# Patient Record
Sex: FEMALE | Race: WHITE | NOT HISPANIC OR LATINO | Employment: FULL TIME | URBAN - NONMETROPOLITAN AREA
[De-identification: names, ages, dates, MRNs, and addresses within clinical notes are randomized per-mention and may not be internally consistent; named-entity substitution may affect disease eponyms.]

---

## 2016-01-15 LAB — HM PAP SMEAR: NORMAL

## 2017-01-26 ENCOUNTER — OFFICE VISIT (OUTPATIENT)
Dept: OBSTETRICS AND GYNECOLOGY | Facility: CLINIC | Age: 32
End: 2017-01-26

## 2017-01-26 VITALS
BODY MASS INDEX: 23.82 KG/M2 | SYSTOLIC BLOOD PRESSURE: 120 MMHG | HEIGHT: 65 IN | WEIGHT: 143 LBS | DIASTOLIC BLOOD PRESSURE: 62 MMHG

## 2017-01-26 VITALS
WEIGHT: 144 LBS | HEIGHT: 65 IN | BODY MASS INDEX: 23.99 KG/M2 | DIASTOLIC BLOOD PRESSURE: 64 MMHG | SYSTOLIC BLOOD PRESSURE: 116 MMHG

## 2017-01-26 DIAGNOSIS — Z01.419 WOMEN'S ANNUAL ROUTINE GYNECOLOGICAL EXAMINATION: Primary | ICD-10-CM

## 2017-01-26 PROCEDURE — 99385 PREV VISIT NEW AGE 18-39: CPT | Performed by: NURSE PRACTITIONER

## 2017-01-26 RX ORDER — LORATADINE 10 MG/1
TABLET ORAL
COMMUNITY
Start: 2015-03-17 | End: 2018-02-07

## 2017-01-26 NOTE — MR AVS SNAPSHOT
"                        Johana Zavala   2017 2:40 PM   Office Visit    Dept Phone:  [phone]   Encounter #:  [id number]    Provider:  Erin Navarro CNM   Department:  [facility]                Your Full Care Plan              Your Updated Medication List          This list is accurate as of: 17  3:45 PM.  Always use your most recent med list.                loratadine 10 MG tablet   Commonly known as:  CLARITIN               Instructions     None    Patient Instructions History      Upcoming Appointments     Visit Type Date Time Department    ANNUAL 2017  2:40 PM [facility]      [email or web address] Signup     [facility] [email or web address] allows you to send messages to your doctor, view your test results, renew your prescriptions, schedule appointments, and more. To sign up, go to [employer] and click on the Sign Up Now link in the New User? box. Enter your [email or web address] Activation Code exactly as it appears below along with the last four digits of your Social Security Number and your Date of Birth () to complete the sign-up process. If you do not sign up before the expiration date, you must request a new code.    [email or web address] Activation Code: [email or web address]  Expires: 2017  3:45 PM    If you have questions, you can email [email or web address] or call [phone] to talk to our [email or web address] staff. Remember, [email or web address] is NOT to be used for urgent needs. For medical emergencies, dial 911.               Other Info from Your Visit           Allergies     No Known Allergies      Reason for Visit     Gynecologic Exam no complaints       Vital Signs     Blood Pressure Height Weight Breastfeeding? Body Mass Index Smoking Status    120/62 65\" (165.1 cm) 143 lb (64.9 kg) No 23.8 kg/m2 Never Smoker        "

## 2017-01-26 NOTE — PROGRESS NOTES
"Subjective   Chief Complaint   Patient presents with   • Gynecologic Exam     no complaints      Johana Zavala is a 31 y.o. year old No obstetric history on file. presenting to be seen for her annual exam. She has no C/O.  Periods monthly duration 5-6 days - 2 days with heavy flow - takes ibuprofen for cramps -   not sexually active past year    Past Medical History   Diagnosis Date   • Abnormal Pap smear of cervix    • Chlamydia      2002        Current Outpatient Prescriptions:   •  loratadine (CLARITIN) 10 MG tablet, Take  by mouth., Disp: , Rfl:    No Known Allergies   Past Surgical History   Procedure Laterality Date   • Leep     • Cervical biopsy  w/ loop electrode excision     • [place] tooth extraction        Social History     Social History   • Marital status: Single     Spouse name: N/A   • Number of children: N/A   • Years of education: N/A     Occupational History   • Not on file.     Social History Main Topics   • Smoking status: Never Smoker   • Smokeless tobacco: Never Used   • Alcohol use No   • Drug use: No   • Sexual activity: Yes     Birth control/ protection: None     Other Topics Concern   • Not on file     Social History Narrative      Family History   Problem Relation Age of Onset   • Adopted: Yes       The following portions of the patient's history were reviewed and updated as appropriate:problem list, current medications, allergies, past family history, past medical history, past social history and past surgical history.    Review of Systems   Constitutional: Negative.    HENT: Negative.    Eyes: Negative.    Respiratory: Negative.    Cardiovascular: Negative.    Gastrointestinal: Negative.    Endocrine: Negative.    Genitourinary: Negative.    Musculoskeletal: Negative.    Skin: Negative.    Allergic/Immunologic: Negative.    Neurological: Negative.    Hematological: Negative.    Psychiatric/Behavioral: Negative.            Objective   Visit Vitals   • /62   • Ht 65\" (165.1 cm)   • Wt " 143 lb (64.9 kg)   • Breastfeeding No   • BMI 23.8 kg/m2       Physical Exam   Constitutional: She is oriented to person, place, and time. She appears well-developed and well-nourished.   HENT:   Head: Normocephalic and atraumatic.   Neck: Normal range of motion. No thyromegaly present.   Pulmonary/Chest: Effort normal and breath sounds normal. Right breast exhibits no mass, no nipple discharge, no skin change and no tenderness. Left breast exhibits no inverted nipple, no mass, no nipple discharge, no skin change and no tenderness. Breasts are symmetrical. There is no breast swelling.   Abdominal: Soft. She exhibits no distension. There is no tenderness.   Genitourinary: Vagina normal and uterus normal. No breast tenderness, discharge or bleeding.   Musculoskeletal: Normal range of motion.   Neurological: She is alert and oriented to person, place, and time.   Skin: Skin is warm and dry.   Psychiatric: She has a normal mood and affect. Her behavior is normal. Judgment and thought content normal.            Assessment /Plan    Normal GYN exam   Pap today  SBE monthly   Options re: lightening periods - declined   Options for labs - declined   Call prn     There are no diagnoses linked to this encounter.             This note was electronically signed.    Erin Navarro CNM   January 26, 2017

## 2018-02-07 ENCOUNTER — OFFICE VISIT (OUTPATIENT)
Dept: OBSTETRICS AND GYNECOLOGY | Facility: CLINIC | Age: 33
End: 2018-02-07

## 2018-02-07 VITALS
SYSTOLIC BLOOD PRESSURE: 124 MMHG | DIASTOLIC BLOOD PRESSURE: 62 MMHG | WEIGHT: 153 LBS | BODY MASS INDEX: 25.49 KG/M2 | HEIGHT: 65 IN

## 2018-02-07 DIAGNOSIS — Z01.419 ENCOUNTER FOR GYNECOLOGICAL EXAMINATION WITHOUT ABNORMAL FINDING: Primary | ICD-10-CM

## 2018-02-07 PROCEDURE — 99395 PREV VISIT EST AGE 18-39: CPT | Performed by: NURSE PRACTITIONER

## 2018-02-07 RX ORDER — LEVOCETIRIZINE DIHYDROCHLORIDE 5 MG/1
5 TABLET, FILM COATED ORAL EVERY EVENING
COMMUNITY
End: 2023-01-19

## 2018-02-07 RX ORDER — TRIAMCINOLONE ACETONIDE 1 MG/G
CREAM TOPICAL AS NEEDED
COMMUNITY
End: 2023-01-19

## 2018-02-07 RX ORDER — FLUTICASONE PROPIONATE 50 MCG
SPRAY, SUSPENSION (ML) NASAL
COMMUNITY
Start: 2018-01-09

## 2018-02-07 NOTE — PROGRESS NOTES
Chief Complaint   Patient presents with   • Gynecologic Exam     last pap 17, no complaints      Johana Zavala is a 33 y.o. year old  presenting to be seen for her gyn annual exam. Periods monthly - approx 6-7 days - day 2-3 heavy - not problematic and no desire for intervention.   No c/o        Past Medical History:   Diagnosis Date   • Abnormal Pap smear of cervix    • Asthma    • Chlamydia             Current Outpatient Prescriptions:   •  levocetirizine (XYZAL) 5 MG tablet, Take 5 mg by mouth Every Evening., Disp: , Rfl:   •  triamcinolone (KENALOG) 0.1 % cream, Apply  topically As Needed., Disp: , Rfl:   •  Unable to find, 1 each 1 (One) Time. Allergy sublingual drops, Disp: , Rfl:   •  fluticasone (FLONASE) 50 MCG/ACT nasal spray, , Disp: , Rfl:    No Known Allergies   Past Surgical History:   Procedure Laterality Date   • CERVICAL BIOPSY  W/ LOOP ELECTRODE EXCISION     • LEEP     • WISDOM TOOTH EXTRACTION        Social History     Social History   • Marital status: Single     Spouse name: N/A   • Number of children: N/A   • Years of education: N/A     Occupational History   • Not on file.     Social History Main Topics   • Smoking status: Never Smoker   • Smokeless tobacco: Never Used   • Alcohol use No   • Drug use: No   • Sexual activity: Yes     Birth control/ protection: None     Other Topics Concern   • Not on file     Social History Narrative      Family History   Problem Relation Age of Onset   • Adopted: Yes   • Melanoma Mother        The following portions of the patient's history were reviewed and updated as appropriate:problem list, current medications, allergies, past family history, past medical history, past social history and past surgical history.    Review of Systems     Constitutional: Negative.    HENT: Negative.    Respiratory: Negative.    Cardiovascular: Negative.    Gastrointestinal: Negative for abdominal pain, constipation and diarrhea.   Endocrine: Negative.   "  Genitourinary: Negative.    Musculoskeletal: Negative.    Neurological: Negative.    Psychiatric/Behavioral: Negative.         Objective    /62  Ht 165.1 cm (65\")  Wt 69.4 kg (153 lb)  LMP 01/15/2018  Breastfeeding? No  BMI 25.46 kg/m2    Physical Exam    Constitutional   The patient is awake, alert, well developed, well nourished and well groomed.   Neck   The neck is supple and the trachea is midline. The thyroid is not enlarged and there are no palpable nodules.   Breast  Inspection of the breast reveals symmetrical and smooth breast bilaterally without skin color changes, lesions, dimpling or skin retraction.  The nipples are  everted and without discharge.  Palpation of the breast the tissue is non-tender, smooth, without masses.  The axillae are without masses.   Respiratory  The patient is relaxed and breathes without effort. Lungs CTAB  Cardiovascular  Heart regular rate and rhythm without murmur.  Gastrointestinal   The abdomen is soft and non-tender.  No hepatosplenomegaly.  Genitourinary   - External Genitalia without erythema, lesions, or masses  -Urethral Meatus is without erythema, edema, prolapse or lesions.   -Bladder - Palpation at the region above the symphysis pubis             reveals no bladder tenderness.   -Vagina - There is no excessive vaginal discharge.&    vaginal walls reveals moist vaginal mucosa without inflammation or lesions    There is no evidence of pelvic relaxation; there is no cystocele or rectocele.  -Cervix  is smooth, pink, and without discharge. Negative cervical motion tenderness   Uterus - uterine body is of normal size, shape, & without tenderness  Adnexa structures are nontender and without masses  Perineum is without inflammation or lesions   Extremities  Full ROM. No cyanosis or edema   Skin  Normal in color, texture, moisture, temperature, mobility and turgor. There are no abnormal lesions.    Psychiatric  The patient is oriented to person, place, and " time. Speech is fluent and words are clear          Assessment   Normal GYN exam      Plan  Pap was done today.  If she does not receive the results of the Pap within 2 weeks  time, she was instructed to call to find out the results.  I explained to Johana that the recommendations for Pap smear interval in a low risk patient  has lengthened to 3 years time.  I encouraged her to be seen yearly for a full physical exam including breast and pelvic exam even during the off years when PAP's will not be performed.  SBE monthly   Option for general labs lipid profile / CBC / CMP -  declined   Call prn        This note was electronically signed.    Erin Navarro CNM   February 7, 2018

## 2018-02-16 DIAGNOSIS — Z01.419 ENCOUNTER FOR GYNECOLOGICAL EXAMINATION WITHOUT ABNORMAL FINDING: ICD-10-CM

## 2019-02-15 ENCOUNTER — OFFICE VISIT (OUTPATIENT)
Dept: OBSTETRICS AND GYNECOLOGY | Facility: CLINIC | Age: 34
End: 2019-02-15

## 2019-02-15 VITALS
SYSTOLIC BLOOD PRESSURE: 122 MMHG | BODY MASS INDEX: 24.32 KG/M2 | DIASTOLIC BLOOD PRESSURE: 62 MMHG | WEIGHT: 146 LBS | HEIGHT: 65 IN

## 2019-02-15 DIAGNOSIS — Z01.419 ENCOUNTER FOR GYNECOLOGICAL EXAMINATION WITHOUT ABNORMAL FINDING: Primary | ICD-10-CM

## 2019-02-15 PROCEDURE — 99395 PREV VISIT EST AGE 18-39: CPT | Performed by: NURSE PRACTITIONER

## 2019-02-15 RX ORDER — BETAMETHASONE DIPROPIONATE 0.05 %
OINTMENT (GRAM) TOPICAL
COMMUNITY
End: 2023-01-19

## 2019-02-15 RX ORDER — MONTELUKAST SODIUM 10 MG/1
TABLET ORAL
COMMUNITY
End: 2023-01-19

## 2019-02-15 RX ORDER — EPINEPHRINE 0.3 MG/.3ML
INJECTION SUBCUTANEOUS
COMMUNITY
End: 2023-01-19

## 2019-02-15 NOTE — PROGRESS NOTES
Chief Complaint   Patient presents with   • Gynecologic Exam     last pap 18, no complaints      Johana Zavala is a 34 y.o. year old  presenting to be seen for her annual exam.  She has monthly periods - duration is 6 days 2 days heavy - she uses ibuprofen for cramps if needed.  She has no c/o - would like to have labs drawn for work / glucose - lipid profile.   She reports she lives a healthy life style / exercise and good nutrition ...    There is no family hx of breast or ovarian cancer        Past Medical History:   Diagnosis Date   • Abnormal Pap smear of cervix    • Asthma    • Chlamydia             Current Outpatient Medications:   •  betamethasone dipropionate (DIPROLENE) 0.05 % ointment, betamethasone dipropionate 0.05 % topical ointment, Disp: , Rfl:   •  Betamethasone Dipropionate (SERNIVO) 0.05 % emulsion, Sernivo 0.05 % topical spray with pump, Disp: , Rfl:   •  Crisaborole (EUCRISA) 2 % ointment, Eucrisa 2 % topical ointment, Disp: , Rfl:   •  EPINEPHrine (EPIPEN) 0.3 MG/0.3ML solution auto-injector injection, epinephrine 0.3 mg/0.3 mL injection, auto-injector, Disp: , Rfl:   •  fluticasone (FLONASE) 50 MCG/ACT nasal spray, , Disp: , Rfl:   •  levocetirizine (XYZAL) 5 MG tablet, Take 5 mg by mouth Every Evening., Disp: , Rfl:   •  montelukast (SINGULAIR) 10 MG tablet, montelukast 10 mg tablet, Disp: , Rfl:   •  triamcinolone (KENALOG) 0.1 % cream, Apply  topically As Needed., Disp: , Rfl:   •  Unable to find, 1 each 1 (One) Time. Allergy sublingual drops, Disp: , Rfl:    No Known Allergies   Past Surgical History:   Procedure Laterality Date   • CERVICAL BIOPSY  W/ LOOP ELECTRODE EXCISION     • LEEP     • WISDOM TOOTH EXTRACTION        Social History     Socioeconomic History   • Marital status: Single     Spouse name: Not on file   • Number of children: Not on file   • Years of education: Not on file   • Highest education level: Not on file   Social Needs   • Financial resource strain:  "Not on file   • Food insecurity - worry: Not on file   • Food insecurity - inability: Not on file   • Transportation needs - medical: Not on file   • Transportation needs - non-medical: Not on file   Occupational History   • Not on file   Tobacco Use   • Smoking status: Never Smoker   • Smokeless tobacco: Never Used   Substance and Sexual Activity   • Alcohol use: No   • Drug use: No   • Sexual activity: Yes     Birth control/protection: None   Other Topics Concern   • Not on file   Social History Narrative   • Not on file      Family History   Adopted: Yes   Problem Relation Age of Onset   • Melanoma Mother        The following portions of the patient's history were reviewed and updated as appropriate:problem list, current medications, allergies, past family history, past medical history, past social history and past surgical history.      Review of Systems  Constitutional: Negative.    HENT: Negative.    Respiratory: Negative.    Cardiovascular: Negative.    Gastrointestinal: Negative for abdominal pain, constipation and diarrhea.   Endocrine: Negative.    Genitourinary: Negative.    Musculoskeletal: Negative.    Neurological: Negative.    Psychiatric/Behavioral: Negative.         Objective    /62   Ht 165.1 cm (65\")   Wt 66.2 kg (146 lb)   Breastfeeding? No   BMI 24.30 kg/m²     Physical Exam    Constitutional   The patient is awake, alert, well developed, well nourished and well groomed.   Neck   The neck is supple and the trachea is midline.    Breast  Examined in supine position.  Symmetrical without masses or skin dimpling.   The nipples are normal without inversion or discharge.   There are no palpable axillary nodes.    Respiratory  The patient is relaxed and breathes without effort.  Gastrointestinal   The abdomen is soft and non-tender.  No hepatosplenomegaly noted.  Genitourinary   - External Genitalia without erythema, lesions, or masses  -Urethral Meatus normal.   -Vagina - normal pink mucosa " without lesions.      There is no evidence of pelvic relaxation; there is no cystocele or rectocele.  -Cervix:  Normal appearance - Negative cervical motion tenderness   Uterus - uterine body is of normal size, shape, & without tenderness  Adnexa - normal bimanual exam - no masses are palpable  Perineum is without inflammation or lesions  Extremities  Full ROM.   Skin  Warm and dry    Psychiatric  The patient is oriented to person, place, and time. Speech is fluent and words are clear          Assessment   Normal GYN exam      Plan  Pap was done today.  If she does not receive the results of the Pap within 2 weeks  time, she was instructed to call to find out the results.  I explained to Johana that the recommendations for Pap smear interval in a low risk patient  has lengthened to 3 years time.  I encouraged her to be seen yearly for a full physical exam including breast and pelvic exam even during the off years when PAP's will not be performed.  SBE monthly   Will order labs as requested - instructed NPO   Call prn       This note was electronically signed.    Erin Navarro CNM   February 15, 2019

## 2019-02-18 LAB
ALBUMIN SERPL-MCNC: 4.5 G/DL (ref 3.5–5)
ALBUMIN/GLOB SERPL: 1.8 G/DL (ref 1–2)
ALP SERPL-CCNC: 44 U/L (ref 38–126)
ALT SERPL-CCNC: 26 U/L (ref 13–69)
AST SERPL-CCNC: 19 U/L (ref 15–46)
BASOPHILS # BLD AUTO: 0.04 10*3/MM3 (ref 0–0.2)
BASOPHILS NFR BLD AUTO: 0.6 % (ref 0–2.5)
BILIRUB SERPL-MCNC: 0.5 MG/DL (ref 0.2–1.3)
BUN SERPL-MCNC: 8 MG/DL (ref 7–20)
BUN/CREAT SERPL: 11.4 (ref 7.1–23.5)
CALCIUM SERPL-MCNC: 9.7 MG/DL (ref 8.4–10.2)
CHLORIDE SERPL-SCNC: 105 MMOL/L (ref 98–107)
CHOLEST SERPL-MCNC: 161 MG/DL (ref 0–199)
CO2 SERPL-SCNC: 25 MMOL/L (ref 26–30)
CREAT SERPL-MCNC: 0.7 MG/DL (ref 0.6–1.3)
EOSINOPHIL # BLD AUTO: 0.07 10*3/MM3 (ref 0–0.7)
EOSINOPHIL NFR BLD AUTO: 1.1 % (ref 0–7)
ERYTHROCYTE [DISTWIDTH] IN BLOOD BY AUTOMATED COUNT: 13.1 % (ref 11.5–14.5)
GLOBULIN SER CALC-MCNC: 2.5 GM/DL
GLUCOSE SERPL-MCNC: 90 MG/DL (ref 74–98)
HCT VFR BLD AUTO: 41.4 % (ref 37–47)
HDLC SERPL-MCNC: 65 MG/DL (ref 40–60)
HGB BLD-MCNC: 14.1 G/DL (ref 12–16)
IMM GRANULOCYTES # BLD AUTO: 0.01 10*3/MM3 (ref 0–0.06)
IMM GRANULOCYTES NFR BLD AUTO: 0.2 % (ref 0–0.6)
LDLC SERPL CALC-MCNC: 86 MG/DL (ref 0–99)
LYMPHOCYTES # BLD AUTO: 1.33 10*3/MM3 (ref 0.6–3.4)
LYMPHOCYTES NFR BLD AUTO: 21 % (ref 10–50)
MCH RBC QN AUTO: 32.6 PG (ref 27–31)
MCHC RBC AUTO-ENTMCNC: 34.1 G/DL (ref 30–37)
MCV RBC AUTO: 95.6 FL (ref 81–99)
MONOCYTES # BLD AUTO: 0.51 10*3/MM3 (ref 0–0.9)
MONOCYTES NFR BLD AUTO: 8.1 % (ref 0–12)
NEUTROPHILS # BLD AUTO: 4.36 10*3/MM3 (ref 2–6.9)
NEUTROPHILS NFR BLD AUTO: 69 % (ref 37–80)
NRBC BLD AUTO-RTO: 0 /100 WBC (ref 0–0)
PLATELET # BLD AUTO: 204 10*3/MM3 (ref 130–400)
POTASSIUM SERPL-SCNC: 4 MMOL/L (ref 3.5–5.1)
PROT SERPL-MCNC: 7 G/DL (ref 6.3–8.2)
RBC # BLD AUTO: 4.33 10*6/MM3 (ref 4.2–5.4)
SODIUM SERPL-SCNC: 139 MMOL/L (ref 137–145)
TRIGL SERPL-MCNC: 50 MG/DL
VLDLC SERPL CALC-MCNC: 10 MG/DL
WBC # BLD AUTO: 6.32 10*3/MM3 (ref 4.8–10.8)

## 2019-02-20 ENCOUNTER — RESULTS ENCOUNTER (OUTPATIENT)
Dept: OBSTETRICS AND GYNECOLOGY | Facility: CLINIC | Age: 34
End: 2019-02-20

## 2019-02-20 DIAGNOSIS — Z01.419 ENCOUNTER FOR GYNECOLOGICAL EXAMINATION WITHOUT ABNORMAL FINDING: ICD-10-CM

## 2019-02-25 DIAGNOSIS — Z01.419 ENCOUNTER FOR GYNECOLOGICAL EXAMINATION WITHOUT ABNORMAL FINDING: ICD-10-CM

## 2020-10-07 ENCOUNTER — TREATMENT (OUTPATIENT)
Dept: PHYSICAL THERAPY | Facility: CLINIC | Age: 35
End: 2020-10-07

## 2020-10-07 DIAGNOSIS — S76.011S STRAIN OF RIGHT HIP, SEQUELA: Primary | ICD-10-CM

## 2020-10-07 DIAGNOSIS — S86.911S STRAIN OF RIGHT KNEE, SEQUELA: ICD-10-CM

## 2020-10-07 PROCEDURE — 97110 THERAPEUTIC EXERCISES: CPT | Performed by: PHYSICAL THERAPIST

## 2020-10-07 PROCEDURE — 97161 PT EVAL LOW COMPLEX 20 MIN: CPT | Performed by: PHYSICAL THERAPIST

## 2020-10-07 PROCEDURE — 97530 THERAPEUTIC ACTIVITIES: CPT | Performed by: PHYSICAL THERAPIST

## 2020-10-07 NOTE — PROGRESS NOTES
Physical Therapy Initial Evaluation and Plan of Care      Patient: Johana Zavala   : 1985  Diagnosis/ICD-10 Code:  No primary diagnosis found.  Referring practitioner: Rosalio Vora PA-C    Subjective Evaluation    History of Present Illness  Mechanism of injury: R knee pain for 2 months when she started running again.      She started running 6 months ago.  Switching from TM to hard surface and the pain elevated.     R lateral and inferior knee pain on the R LE.      Pt is training for the 5k in 4 weeks.     Pt with the brace for 2 weeks in use when she is running.     Pain  Current pain ratin  At worst pain ratin  Location: R knee lateral knee.    Aggravating factors: prolonged positioning, stairs, squatting, repetitive movement and movement  Progression: worsening             Objective          Observations     Additional Knee Observation Details  R over Left legs crossed.     Ambulation- noted R hip trendelenburg slightly noted on the R LE.  Pt is not having R UE moving with ambulation.      Palpation     Right   Hypertonic in the distal biceps femoris, gluteus ady, gluteus medius and proximal semitendinosus.   Hypotonic in the piriformis. Tenderness of the distal biceps femoris, gluteus ady, gluteus medius, proximal semimembranosus and proximal semitendinosus.     Tenderness     Right Hip   Tenderness in the greater trochanter.     Active Range of Motion   Left Knee   Normal active range of motion    Right Knee   Normal active range of motion    Passive Range of Motion   Left Knee   Normal passive range of motion    Right Knee   Normal passive range of motion    Patellar Mobility     Right Knee Patellar tendons within functional limits include the medial, lateral, superior and inferior.     Additional Patellar Mobility Details  No subluxation noted and no hypersensitivity noted.     Strength/Myotome Testing     Left Hip   Planes of Motion   Abduction: 4+  External rotation: 4+    Right  Hip   Planes of Motion   Abduction: 3+  External rotation: 3+    Right Knee   Flexion: 4-  Prone flexion: 3+  Quadriceps contraction: fair    Additional Strength Details  Pain with R hip testing.     Tests     Additional Tests Details  R hip seemed to be most provocative for pain in the R Knee.               Assessment & Plan     Assessment  Impairments: abnormal gait, abnormal muscle tone, abnormal or restricted ROM, activity intolerance, impaired balance, impaired physical strength, lacks appropriate home exercise program and pain with function  Assessment details: Patient is a 35 year old female who comes to physical therapy R knee pain. Signs and symptoms are consistent with PF pain from R hip MM strain and poor mechanical alignment with activity.  The patient currently has pain, decreased ROM, decreased strength, and inability to perform all essential functional activities. Pt will benefit from skilled PT services to address the above issues.     Prognosis: good  Prognosis details:   SHORT TERM GOALS:  2 weeks       1. Pt independent with HEP  2. Pt to demonstrate qing hip strength 4/5 or greater to improve stability with ambulation  3. Pt to report being able to walk for 10 minutes without increasing pain in the right knee    LONG TERM GOALS:   6 weeks  1. Pt to demonstrate ability to perform full functional squat with good form and control of the knees and without increasing pain  2. Pt to demonstrate qing hip strength to 4+/5 or greater to improve safety with ambulation on uneven surfaces  3. Pt to return to work full duty without increased pain in the right knee   4. Pt to demonstrate ability to perform step up/down 8 inch step x10 safely and without pain in the right knee   Functional Limitations: carrying objects, walking, uncomfortable because of pain, standing and unable to perform repetitive tasks  Plan  Therapy options: will be seen for skilled physical therapy services  Planned modality interventions:  cryotherapy, electrical stimulation/Russian stimulation, thermotherapy (hydrocollator packs) and ultrasound  Planned therapy interventions: stretching, strengthening, soft tissue mobilization, manual therapy, motor coordination training, neuromuscular re-education, ADL retraining, balance/weight-bearing training, flexibility, functional ROM exercises, gait training and home exercise program  Treatment plan discussed with: patient  Plan details: Pt will be seen 1-2 times per week for skilled PT.         Manual Therapy:    2     mins  37375;  Therapeutic Exercise:    18     mins  97733;     Neuromuscular Chip:        mins  76933;    Therapeutic Activity:     9     mins  68856;     Gait Training:           mins  92709;     Ultrasound:          mins  48390;    Electrical Stimulation:         mins  66921 ( );  Dry Needling          mins self-pay    Timed Treatment:   29   mins   Total Treatment:     50   mins    PT SIGNATURE: Fortino Noe, PT   DATE TREATMENT INITIATED: 10/7/2020    Initial Certification  Certification Period: 1/5/2021  I certify that the therapy services are furnished while this patient is under my care.  The services outlined above are required by this patient, and will be reviewed every 90 days.     PHYSICIAN: Rosalio Vora PA-C      DATE:     Please sign and return via fax to  .. Thank you, [facility].

## 2020-10-09 ENCOUNTER — TREATMENT (OUTPATIENT)
Dept: PHYSICAL THERAPY | Facility: CLINIC | Age: 35
End: 2020-10-09

## 2020-10-09 DIAGNOSIS — S76.011S STRAIN OF RIGHT HIP, SEQUELA: Primary | ICD-10-CM

## 2020-10-09 DIAGNOSIS — S86.911S STRAIN OF RIGHT KNEE, SEQUELA: ICD-10-CM

## 2020-10-09 PROCEDURE — 97140 MANUAL THERAPY 1/> REGIONS: CPT | Performed by: PHYSICAL THERAPIST

## 2020-10-09 PROCEDURE — 97035 APP MDLTY 1+ULTRASOUND EA 15: CPT | Performed by: PHYSICAL THERAPIST

## 2020-10-09 PROCEDURE — 97110 THERAPEUTIC EXERCISES: CPT | Performed by: PHYSICAL THERAPIST

## 2020-10-09 NOTE — PROGRESS NOTES
Physical Therapy Daily Progress Note    Patient Information  Johana Zavala  1985      Visit # : 2    Johana Zavala reports 5/10 pain today at rest.  Pt reports she is doing well with the exercises.     Pt reports that yesterday she was pain    Objective Pt presents to PT today with no distress noted at rest.     Palpation:  R glute medius primary area of pain.     Weakness still present in the R hip.      See Exercise, Manual, and Modality Logs for complete treatment.     Assessment/Plan  Pt with R glute med primary area of pain.  The knee is feeling better overall but she then ran and maybe irritated the knee.       Progress per Plan of Care and Progress strengthening /stabilization /functional activity      Visit Diagnoses:    ICD-10-CM ICD-9-CM   1. Strain of right hip, sequela  S76.011S 905.7   2. Strain of right knee, sequela  S86.911S 905.7         Manual Therapy:    11     mins  66478;  Therapeutic Exercise:    33     mins  96372;     Neuromuscular Chip:        mins  14391;    Therapeutic Activity:          mins  83633;     Gait Training:           mins  37160;     Ultrasound:     9     mins  32370;    Electrical Stimulation:         mins  27549 ( );  Dry Needling          mins self-pay          Timed Treatment:   53   mins   Total Treatment:     53   mins    Fortino Noe, PT  Physical Therapist

## 2020-10-12 ENCOUNTER — TREATMENT (OUTPATIENT)
Dept: PHYSICAL THERAPY | Facility: CLINIC | Age: 35
End: 2020-10-12

## 2020-10-12 DIAGNOSIS — S86.911S STRAIN OF RIGHT KNEE, SEQUELA: ICD-10-CM

## 2020-10-12 DIAGNOSIS — S76.011S STRAIN OF RIGHT HIP, SEQUELA: Primary | ICD-10-CM

## 2020-10-12 PROCEDURE — 97110 THERAPEUTIC EXERCISES: CPT | Performed by: PHYSICAL THERAPIST

## 2020-10-12 PROCEDURE — 97035 APP MDLTY 1+ULTRASOUND EA 15: CPT | Performed by: PHYSICAL THERAPIST

## 2020-10-12 PROCEDURE — 97530 THERAPEUTIC ACTIVITIES: CPT | Performed by: PHYSICAL THERAPIST

## 2020-10-12 NOTE — PROGRESS NOTES
"Physical Therapy Daily Progress Note    Patient Information  Johana Zavala  1985      Visit # : 3    Johana Zavala reports 2-3/10 pain today at rest.  0/10 pain before the run this morning.   Pain began an hour after the run.         Objective Pt presents to PT today with no distress noted.     R knee Patellar tendon seem to be tender with a slight referred pain into the R shin area.      Resisted knee ext referred pain to the shin area.      6\" step up is painful in the knee but a 4\" step up is pain free.       See Exercise, Manual, and Modality Logs for complete treatment.     Assessment/Plan  Pt's pain seems to be more related to patellar tendonitis from weakness in the R hip.  Pt seems to be responding well to the treatments today.        Progress per Plan of Care and Progress strengthening /stabilization /functional activity  Check response to treatment.     Visit Diagnoses:    ICD-10-CM ICD-9-CM   1. Strain of right hip, sequela  S76.011S 905.7   2. Strain of right knee, sequela  S86.911S 905.7     Manual Therapy:         mins  99203;  Therapeutic Exercise:    31     mins  69228;     Neuromuscular Chip:        mins  86482;    Therapeutic Activity:     9     mins  25654;     Gait Training:           mins  63987;     Ultrasound:     10     mins  56345;    Electrical Stimulation:         mins  80485 ( );  Dry Needling          mins self-pay             Timed Treatment:   50   mins   Total Treatment:     50   mins    Fortino Noe, PT  Physical Therapist        "

## 2020-10-14 ENCOUNTER — TREATMENT (OUTPATIENT)
Dept: PHYSICAL THERAPY | Facility: CLINIC | Age: 35
End: 2020-10-14

## 2020-10-14 DIAGNOSIS — S76.011S STRAIN OF RIGHT HIP, SEQUELA: Primary | ICD-10-CM

## 2020-10-14 DIAGNOSIS — S86.911S STRAIN OF RIGHT KNEE, SEQUELA: ICD-10-CM

## 2020-10-14 PROCEDURE — 97530 THERAPEUTIC ACTIVITIES: CPT | Performed by: PHYSICAL THERAPIST

## 2020-10-14 PROCEDURE — 97110 THERAPEUTIC EXERCISES: CPT | Performed by: PHYSICAL THERAPIST

## 2020-10-14 NOTE — PROGRESS NOTES
Physical Therapy Daily Progress Note    Patient Information  Johana Zavala  1985      Visit # : 4    Johana Zavala reports 0/10 pain today at rest.  2/10 pain walking this morning.         Objective Pt presents to PT today with no distress at rest.     Palpation:  Patellar tendon at the attachment point on the tibia is very sensitive and tender.    VMO activity on the R LE is less than L LE.      SLS with excursion R LE is creating pain in the R knee.     Pain in the R knee with RDL.      See Exercise, Manual, and Modality Logs for complete treatment.     Assessment/Plan  Pt with inflammation elevated in patellar tendon and tibia.  Pt with improved strength in the hip and the knee but the knee is still painful with running.       Progress per Plan of Care and Progress strengthening /stabilization /functional activity      Visit Diagnoses:    ICD-10-CM ICD-9-CM   1. Strain of right hip, sequela  S76.011S 905.7   2. Strain of right knee, sequela  S86.911S 905.7         Manual Therapy:         mins  53462;  Therapeutic Exercise:    42     mins  59451;     Neuromuscular Chip:        mins  06430;    Therapeutic Activity:     12     mins  86606;     Gait Training:           mins  09794;     Ultrasound:          mins  96803;    Electrical Stimulation:         mins  99616 ( );  Dry Needling          mins self-pay          Timed Treatment:   54   mins   Total Treatment:     54   mins    Fortino Noe, PT  Physical Therapist

## 2020-10-19 ENCOUNTER — TREATMENT (OUTPATIENT)
Dept: PHYSICAL THERAPY | Facility: CLINIC | Age: 35
End: 2020-10-19

## 2020-10-19 DIAGNOSIS — S76.011S STRAIN OF RIGHT HIP, SEQUELA: Primary | ICD-10-CM

## 2020-10-19 DIAGNOSIS — S86.911S STRAIN OF RIGHT KNEE, SEQUELA: ICD-10-CM

## 2020-10-19 PROCEDURE — 97530 THERAPEUTIC ACTIVITIES: CPT | Performed by: PHYSICAL THERAPIST

## 2020-10-19 PROCEDURE — 97110 THERAPEUTIC EXERCISES: CPT | Performed by: PHYSICAL THERAPIST

## 2020-10-19 NOTE — PROGRESS NOTES
Physical Therapy Daily Progress Note    Patient Information  Johana Zavala  1985      Visit # : 5    Johana Zavala reports 0/10 pain today at rest.  Pt reports she did really well past 3 days from oral steroid for a dental issue.     No pain running today but after icing she had pain in the R Knee.       Objective Pt presents to PT today with no distress noted and no pain with ambulation and testing.     Pt's walking and running mechanics are noted to have good foot strike.  She may be adducting the hip too much during the weight acceptance phase.      See Exercise, Manual, and Modality Logs for complete treatment.     Assessment/Plan  Pt with improved status from taking steroids for a tooth issue.  She is getting stronger in the hip and having less ground reaction forces noted.       Progress per Plan of Care and Progress strengthening /stabilization /functional activity      Visit Diagnoses:    ICD-10-CM ICD-9-CM   1. Strain of right hip, sequela  S76.011S 905.7   2. Strain of right knee, sequela  S86.911S 905.7            Manual Therapy:         mins  59513;  Therapeutic Exercise:    31     mins  67242;     Neuromuscular Chip:        mins  16028;    Therapeutic Activity:     24     mins  06711;     Gait Training:           mins  46771;     Ultrasound:          mins  17605;    Electrical Stimulation:         mins  48164 ( );  Dry Needling          mins self-pay    Timed Treatment:   55   mins   Total Treatment:     55   mins          Fortino Noe, PT  Physical Therapist

## 2020-10-23 ENCOUNTER — TREATMENT (OUTPATIENT)
Dept: PHYSICAL THERAPY | Facility: CLINIC | Age: 35
End: 2020-10-23

## 2020-10-23 DIAGNOSIS — S86.911S STRAIN OF RIGHT KNEE, SEQUELA: ICD-10-CM

## 2020-10-23 DIAGNOSIS — S76.011S STRAIN OF RIGHT HIP, SEQUELA: Primary | ICD-10-CM

## 2020-10-23 PROCEDURE — 97530 THERAPEUTIC ACTIVITIES: CPT | Performed by: PHYSICAL THERAPIST

## 2020-10-23 PROCEDURE — 97110 THERAPEUTIC EXERCISES: CPT | Performed by: PHYSICAL THERAPIST

## 2020-10-23 NOTE — PROGRESS NOTES
Physical Therapy Daily Progress Note    Patient Information  Johana Zavala  1985      Visit # : 6    Johana Zavala reports 2/10 pain today at rest.  Pt reports she is now off the steroid.          Objective Pt presents to PT today with no distress.      MMT:  R hip abd 4/5,  L hip abd 5/5,     R hip ext 4+/5,  L hip ext 5/5    Palpation:  Tenderness noted in the patellar tendon at the insertion.      Pt with good use of the R LE without elevated pain during HEP.      HEP is finalized and pain free        See Exercise, Manual, and Modality Logs for complete treatment.     Assessment/Plan  Pt good response to the treatment.  She is continuing to improve with her strength.  She is continuing to have pain with running but she will discontinue in 1 week after her race.  She is independent with HEP and with her knowledge of healing.         Pt will call within 4 weeks if further needs arise otherwise she will be discharged in 4 weeks if no return.      Visit Diagnoses:    ICD-10-CM ICD-9-CM   1. Strain of right hip, sequela  S76.011S 905.7   2. Strain of right knee, sequela  S86.911S 905.7            Manual Therapy:         mins  32898;  Therapeutic Exercise:    38     mins  45070;     Neuromuscular Chip:        mins  08705;    Therapeutic Activity:     11     mins  04627;     Gait Training:           mins  29414;     Ultrasound:          mins  67396;    Electrical Stimulation:         mins  79795 ( );  Dry Needling          mins self-pay    Timed Treatment:   49   mins   Total Treatment:     49   mins          Fortino Noe, PT  Physical Therapist

## 2021-05-14 ENCOUNTER — OFFICE VISIT (OUTPATIENT)
Dept: OBSTETRICS AND GYNECOLOGY | Facility: CLINIC | Age: 36
End: 2021-05-14

## 2021-05-14 VITALS
HEIGHT: 65 IN | WEIGHT: 149 LBS | BODY MASS INDEX: 24.83 KG/M2 | DIASTOLIC BLOOD PRESSURE: 62 MMHG | SYSTOLIC BLOOD PRESSURE: 110 MMHG

## 2021-05-14 DIAGNOSIS — Z01.419 ENCOUNTER FOR GYNECOLOGICAL EXAMINATION WITHOUT ABNORMAL FINDING: Primary | ICD-10-CM

## 2021-05-14 PROCEDURE — 99395 PREV VISIT EST AGE 18-39: CPT | Performed by: NURSE PRACTITIONER

## 2021-05-14 RX ORDER — AZELASTINE 1 MG/ML
SPRAY, METERED NASAL
COMMUNITY
Start: 2021-03-15

## 2021-05-14 RX ORDER — INFLUENZA A VIRUS A/MICHIGAN/45/2015 X-275 (H1N1) ANTIGEN (FORMALDEHYDE INACTIVATED), INFLUENZA A VIRUS A/SINGAPORE/INFIMH-16-0019/2016 IVR-186 (H3N2) ANTIGEN (FORMALDEHYDE INACTIVATED), INFLUENZA B VIRUS B/PHUKET/3073/2013 ANTIGEN (FORMALDEHYDE INACTIVATED), AND INFLUENZA B VIRUS B/MARYLAND/15/2016 BX-69A ANTIGEN (FORMALDEHYDE INACTIVATED) 15; 15; 15; 15 UG/.5ML; UG/.5ML; UG/.5ML; UG/.5ML
INJECTION, SUSPENSION INTRAMUSCULAR
COMMUNITY

## 2021-05-14 NOTE — PROGRESS NOTES
Chief Complaint   Patient presents with   • Gynecologic Exam     Annual exam and pap smear     Johana Zavala is a 36 y.o. year old  presenting to be seen for her annual exam.     Periods are monthly - 5-7 days 2 days heavy though not bothersome   SBE: Self breast awareness - no changes -   Not sexually active at this time  Does not have PCP though considering   No c/o    Current Outpatient Medications:   •  azelastine (ASTELIN) 0.1 % nasal spray, , Disp: , Rfl:   •  EPINEPHrine (EPIPEN) 0.3 MG/0.3ML solution auto-injector injection, epinephrine 0.3 mg/0.3 mL injection, auto-injector, Disp: , Rfl:   •  fluticasone (FLONASE) 50 MCG/ACT nasal spray, , Disp: , Rfl:   •  influenza vac split quad (Fluzone Quadrivalent) 0.5 ML suspension prefilled syringe injection, Fluzone Quad [id number] (PF) 60 mcg (15 mcg x 4)/0.5 mL IM syringe  ADM 0.5ML IM UTD, Disp: , Rfl:   •  levocetirizine (XYZAL) 5 MG tablet, Take 5 mg by mouth Every Evening., Disp: , Rfl:   •  montelukast (SINGULAIR) 10 MG tablet, montelukast 10 mg tablet, Disp: , Rfl:   •  triamcinolone (KENALOG) 0.1 % cream, Apply  topically As Needed., Disp: , Rfl:   •  Unable to find, 1 each 1 (One) Time. Allergy sublingual drops, Disp: , Rfl:   •  betamethasone dipropionate (DIPROLENE) 0.05 % ointment, betamethasone dipropionate 0.05 % topical ointment, Disp: , Rfl:   •  Betamethasone Dipropionate (SERNIVO) 0.05 % emulsion, Sernivo 0.05 % topical spray with pump, Disp: , Rfl:   •  Crisaborole (EUCRISA) 2 % ointment, Eucrisa 2 % topical ointment, Disp: , Rfl:       Past Medical History:   Diagnosis Date   • Abnormal Pap smear of cervix    • Asthma    • Chlamydia          Past Surgical History:   Procedure Laterality Date   • CERVICAL BIOPSY  W/ LOOP ELECTRODE EXCISION     • LEEP     • WISDOM TOOTH EXTRACTION        Social History     Socioeconomic History   • Marital status: Single     Spouse name: Not on file   • Number of children: Not on file   • Years of  "education: Not on file   • Highest education level: Not on file   Tobacco Use   • Smoking status: Never Smoker   • Smokeless tobacco: Never Used   Vaping Use   • Vaping Use: Never used   Substance and Sexual Activity   • Alcohol use: No   • Drug use: No   • Sexual activity: Yes     Birth control/protection: None        Family History   Adopted: Yes   Problem Relation Age of Onset   • Melanoma Mother      No Known Allergies    The following portions of the patient's history were reviewed and updated as appropriate:problem list, current medications, allergies, past family history, past medical history, past social history and past surgical history.    Pertinent items are noted in HPI, all other systems reviewed and negative.    Objective    /62   Ht 165.1 cm (65\")   Wt 67.6 kg (149 lb)   LMP 04/21/2021   Breastfeeding No   BMI 24.79 kg/m²     Physical Exam    Constitutional   The patient is alert & oriented to person, place and time. She is well developed, well nourished.   Neck   The neck is with normal range of motion. Neck is supple and the trachea is midline.    Chest / Pulmonary & Breast   Breathing - unlabored  Breast: Examined in supine position.  Symmetrical without masses or skin dimpling bilaterally   Nipples bilaterally are normal without inversion or discharge.   There are no palpable axillary nodes.    Gastrointestinal   The abdomen is soft and non-tender.  No hepatosplenomegaly noted.  Genitourinary   - External Genitalia without erythema, lesions, or masses  -Vagina - normal pink mucosa without lesions.    -Cervix:  Normal appearance - Negative cervical motion tenderness   Uterus - uterine body is of normal size, shape, & without tenderness  Adnexa - normal bimanual exam - no masses are palpable  Perineum is without inflammation or lesions  Extremities  Full ROM.   Skin  Warm and dry    Psychiatric  The patient is oriented to person, place, and time. Speech is fluent and words are " clear    Assessment   Normal GYN exam      Plan  Pap was done today.  If she does not receive the results of the Pap within 2 weeks  time, she was instructed to call to find out the results.  I explained to Johana that the recommendations for Pap smear interval in a low risk patient  has lengthened to 3 years time.  I encouraged her to be seen yearly for a full physical exam including breast and pelvic exam even during the off years when PAP's will not be performed.  SBE monthly   Call prn         This note was electronically signed.    Erin Navarro CNM   May 14, 2021

## 2021-05-26 DIAGNOSIS — Z01.419 ENCOUNTER FOR GYNECOLOGICAL EXAMINATION WITHOUT ABNORMAL FINDING: ICD-10-CM

## 2023-01-19 ENCOUNTER — OFFICE VISIT (OUTPATIENT)
Dept: OBSTETRICS AND GYNECOLOGY | Facility: CLINIC | Age: 38
End: 2023-01-19
Payer: COMMERCIAL

## 2023-01-19 VITALS
SYSTOLIC BLOOD PRESSURE: 112 MMHG | WEIGHT: 153 LBS | HEIGHT: 65 IN | DIASTOLIC BLOOD PRESSURE: 64 MMHG | BODY MASS INDEX: 25.49 KG/M2

## 2023-01-19 DIAGNOSIS — Z30.09 GENERAL COUNSELING AND ADVICE ON FEMALE CONTRACEPTION: Primary | ICD-10-CM

## 2023-01-19 DIAGNOSIS — Z30.011 VISIT FOR ORAL CONTRACEPTIVE PRESCRIPTION: ICD-10-CM

## 2023-01-19 PROCEDURE — 99214 OFFICE O/P EST MOD 30 MIN: CPT | Performed by: OBSTETRICS & GYNECOLOGY

## 2023-01-19 RX ORDER — LEVONORGESTREL AND ETHINYL ESTRADIOL 0.15-0.03
1 KIT ORAL DAILY
Qty: 91 TABLET | Refills: 5 | Status: SHIPPED | OUTPATIENT
Start: 2023-01-19 | End: 2023-04-05 | Stop reason: SINTOL

## 2023-01-28 NOTE — PROGRESS NOTES
GYN Office Visit    Subjective   Chief Complaint   Patient presents with   • Contraception     Wants to discuss birth control options     Johana Zavala is a 37 y.o. year old  presenting to be seen for general counseling regarding contraception.    No specific problems.  She is getting  in the near future and would like to discuss birth control options.    OB Hx: Nulliparous  Pap smear: NILM, absent T-zone, no HPV testing   Mammogram: Never    Past Medical History:   Diagnosis Date   • Abnormal Pap smear of cervix    • Asthma    • Chlamydia        • HPV (human papilloma virus) infection        Past Surgical History:   Procedure Laterality Date   • CERVICAL BIOPSY  W/ LOOP ELECTRODE EXCISION     • LEEP     • WISDOM TOOTH EXTRACTION         Family History   Adopted: Yes   Problem Relation Age of Onset   • Melanoma Mother         Social History     Tobacco Use   • Smoking status: Former     Packs/day: 0.25     Years: 1.00     Pack years: 0.25     Types: Cigarettes   • Smokeless tobacco: Never   • Tobacco comments:     Only smoked on occasion for about a year when I was 16 or 17 years old. Haven't smoked since.   Vaping Use   • Vaping Use: Never used   Substance Use Topics   • Alcohol use: Yes     Alcohol/week: 3.0 standard drinks     Types: 2 Glasses of wine, 1 Cans of beer per week     Comment: Approx. 1-2 drinks per week/every other week   • Drug use: No       (Not in a hospital admission)      Patient has no known allergies.    Current Outpatient Medications on File Prior to Visit   Medication Sig Dispense Refill   • azelastine (ASTELIN) 0.1 % nasal spray      • fluticasone (FLONASE) 50 MCG/ACT nasal spray      • influenza vac split quad (Fluzone Quadrivalent) 0.5 ML suspension prefilled syringe injection Fluzone Quad [id number] (PF) 60 mcg (15 mcg x 4)/0.5 mL IM syringe   ADM 0.5ML IM UTD       No current facility-administered medications on file prior to visit.       Social History     "Tobacco Use      Smoking status: Former        Packs/day: 0.25        Years: 1.00        Pack years: .25        Types: Cigarettes      Smokeless tobacco: Never      Tobacco comments: Only smoked on occasion for about a year when I was 16 or 17 years old. Haven't smoked since.         Objective   /64   Ht 165.1 cm (65\")   Wt 69.4 kg (153 lb)   LMP 01/04/2023   BMI 25.46 kg/m²     Physical Exam:  General Appearance: alert, pleasant, appears stated age, interactive and cooperative         Medical Decision Making:    I reviewed the Pap smear from 2021.    Assessment   General counseling regarding contraception  Pap smear with absent T-zone (2021)     Plan    No orders of the defined types were placed in this encounter.      Medication Management: Seasonale extended cycle OCPs    Procedures Performed: None    We reviewed her situation in detail today.  We discussed birth control options with emphasis on LARCs and permanent sterilization.  Ultimately, the patient opted for extended cycle OCPs.  Rx and instructions provided today.    She would like to return for repeat Pap smear with female provider.  Plan for return in 3-6 months.    Smith Finney MD  Obstetrics and Gynecology  [facility]  "

## 2023-03-20 ENCOUNTER — TELEPHONE (OUTPATIENT)
Dept: OBSTETRICS AND GYNECOLOGY | Facility: CLINIC | Age: 38
End: 2023-03-20
Payer: COMMERCIAL

## 2023-03-20 NOTE — TELEPHONE ENCOUNTER
----- Message from Johana Zavala sent at 3/19/2023  8:47 PM EDT -----  Regarding: Nabeel Side Effect   Contact: [phone]  Hi Dr. Finney,    I just wanted to let you know that the breakthrough bleeding is still occurring on a daily basis. No other major side effects, but it has now been three weeks straight with daily bleeding.  Occasionally, this is accompanied with very minor cramping.  I am otherwise doing just fine, but wanted to update you given our last conversation.   Please let me know if I need to do anything different or if I should continue to stay the course.  Thank you so much!

## 2023-04-03 ENCOUNTER — TELEPHONE (OUTPATIENT)
Dept: OBSTETRICS AND GYNECOLOGY | Facility: CLINIC | Age: 38
End: 2023-04-03
Payer: COMMERCIAL

## 2023-04-03 NOTE — TELEPHONE ENCOUNTER
----- Message from Johana Zavala sent at 4/3/2023  7:16 AM EDT -----  Regarding: Jolessa Side Effect   Contact: [phone]  Hi Dr. Finney,    Just wanted to touch base again regarding the breakthrough bleeding I have experienced while taking Jolessa.   On the days when I took the extra pills (3 for three days, two for two days) I had very little to no bleeding at all. The higher dose seemed to really help. However,  I have been back on the regular one pill dose for a week and have had light to moderate bleeding every day since.  Is there something else I should try with the Jolessa or should I possibly consider a different type altogether? I apologize for all of the messages.  I am just hoping to get adjusted to a med before my wedding this summer.

## 2023-04-05 DIAGNOSIS — Z30.011 VISIT FOR ORAL CONTRACEPTIVE PRESCRIPTION: Primary | ICD-10-CM

## 2023-04-05 RX ORDER — NORGESTIMATE AND ETHINYL ESTRADIOL 7DAYSX3 28
1 KIT ORAL DAILY
Qty: 90 TABLET | Refills: 5 | Status: SHIPPED | OUTPATIENT
Start: 2023-04-05 | End: 2024-04-04

## 2023-05-18 ENCOUNTER — OFFICE VISIT (OUTPATIENT)
Dept: OBSTETRICS AND GYNECOLOGY | Facility: CLINIC | Age: 38
End: 2023-05-18
Payer: COMMERCIAL

## 2023-05-18 VITALS
DIASTOLIC BLOOD PRESSURE: 72 MMHG | BODY MASS INDEX: 25.76 KG/M2 | SYSTOLIC BLOOD PRESSURE: 120 MMHG | HEIGHT: 65 IN | WEIGHT: 154.6 LBS

## 2023-05-18 DIAGNOSIS — Z12.4 SCREENING FOR CERVICAL CANCER: ICD-10-CM

## 2023-05-18 DIAGNOSIS — Z01.419 ROUTINE GYNECOLOGICAL EXAMINATION: Primary | ICD-10-CM

## 2023-05-18 NOTE — PROGRESS NOTES
Subjective   Chief Complaint   Patient presents with   • Gynecologic Exam     Last pap done 21-WNL, No complaints       Johana Zavala is a 38 y.o. year old  presenting to be seen for her annual gynecological exam.   No complaints or concerns  Recently started Tri-sprintec per Dr. Finney. Getting  in 2 weeks  No family history of breast cancer     Past Medical History:   Diagnosis Date   • Abnormal Pap smear of cervix    • Asthma    • Chlamydia        • HPV (human papilloma virus) infection         Current Outpatient Medications:   •  fluticasone (FLONASE) 50 MCG/ACT nasal spray, , Disp: , Rfl:   •  norgestimate-ethinyl estradiol (Tri-Sprintec) 0.18/0.215/0.25 MG-35 MCG per tablet, Take 1 tablet by mouth Daily., Disp: 90 tablet, Rfl: 5   No Known Allergies   Past Surgical History:   Procedure Laterality Date   • CERVICAL BIOPSY  W/ LOOP ELECTRODE EXCISION     • LEEP     • WISDOM TOOTH EXTRACTION        Social History     Socioeconomic History   • Marital status: Single   Tobacco Use   • Smoking status: Former     Packs/day: 0.25     Years: 1.00     Pack years: 0.25     Types: Cigarettes   • Smokeless tobacco: Never   • Tobacco comments:     Only smoked on occasion for about a year when I was 16 or 17 years old. Haven't smoked since.   Vaping Use   • Vaping Use: Never used   Substance and Sexual Activity   • Alcohol use: Yes     Alcohol/week: 2.0 standard drinks     Types: 1 Glasses of wine, 1 Cans of beer per week     Comment: Approx. 1-2 drinks per week/every other week   • Drug use: No   • Sexual activity: Not Currently     Partners: Male     Birth control/protection: Pill, Abstinence      Family History   Adopted: Yes   Problem Relation Age of Onset   • Melanoma Mother        Review of Systems   Constitutional: Negative for chills, diaphoresis and fever.   Gastrointestinal: Negative for constipation, diarrhea, nausea and vomiting.   Genitourinary: Negative for difficulty urinating,  "dysuria, menstrual problem and pelvic pain.           Objective   /72   Ht 165.1 cm (65\")   Wt 70.1 kg (154 lb 9.6 oz)   LMP 04/27/2023 (Approximate)   BMI 25.73 kg/m²     Physical Exam  Exam conducted with a chaperone present.   Constitutional:       Appearance: Normal appearance. She is well-developed and well-groomed.   Eyes:      General: Lids are normal.      Extraocular Movements: Extraocular movements intact.      Conjunctiva/sclera: Conjunctivae normal.   Chest:   Breasts:     Breasts are symmetrical.      Right: No inverted nipple, mass, nipple discharge, skin change or tenderness.      Left: No inverted nipple, mass, nipple discharge, skin change or tenderness.   Abdominal:      General: There is no distension.      Palpations: Abdomen is soft. There is no hepatomegaly or splenomegaly.      Tenderness: There is no abdominal tenderness.   Genitourinary:     Exam position: Lithotomy position.      Labia:         Right: No rash, tenderness or lesion.         Left: No rash, tenderness or lesion.       Urethra: No prolapse, urethral pain, urethral swelling or urethral lesion.      Vagina: No vaginal discharge, tenderness or lesions.      Cervix: No cervical motion tenderness, discharge, friability or lesion.      Uterus: Not enlarged and not tender.       Adnexa:         Right: No mass or tenderness.          Left: No mass or tenderness.     Lymphadenopathy:      Upper Body:      Right upper body: No axillary adenopathy.      Left upper body: No axillary adenopathy.   Skin:     General: Skin is warm and dry.      Findings: No lesion.   Neurological:      General: No focal deficit present.      Mental Status: She is alert and oriented to person, place, and time.   Psychiatric:         Attention and Perception: Attention normal.         Mood and Affect: Mood normal.         Speech: Speech normal.         Behavior: Behavior normal.         Thought Content: Thought content normal.            Result Review " :                   Assessment and Plan  Diagnoses and all orders for this visit:    1. Routine gynecological examination (Primary)    2. Screening for cervical cancer      Patient Instructions   Self breast exam monthly  Screening mammogram starting age 40  Regular exercise               This note was electronically signed.    Tammy Miles PA-C   May 18, 2023

## 2023-05-23 LAB — REF LAB TEST METHOD: NORMAL
